# Patient Record
Sex: MALE | URBAN - METROPOLITAN AREA
[De-identification: names, ages, dates, MRNs, and addresses within clinical notes are randomized per-mention and may not be internally consistent; named-entity substitution may affect disease eponyms.]

---

## 2021-06-03 ENCOUNTER — RECORDS - HEALTHEAST (OUTPATIENT)
Dept: ADMINISTRATIVE | Facility: CLINIC | Age: 56
End: 2021-06-03

## 2021-07-26 ENCOUNTER — TELEPHONE (OUTPATIENT)
Dept: FAMILY MEDICINE | Facility: CLINIC | Age: 56
End: 2021-07-26

## 2021-07-26 NOTE — PROGRESS NOTES
Robert wanted me to relay a message to you regarding medication issues he learned with his visit with Betsy. He stated he is having ejaculate due to hormones that is occurring within his body, and was told this was due to medication he was taking incorrectly. I assured him you are probably aware and can access betsy's notes from his visit.